# Patient Record
Sex: FEMALE | Race: WHITE | Employment: OTHER | URBAN - METROPOLITAN AREA
[De-identification: names, ages, dates, MRNs, and addresses within clinical notes are randomized per-mention and may not be internally consistent; named-entity substitution may affect disease eponyms.]

---

## 2017-06-05 PROBLEM — Z51.81 MEDICATION MONITORING ENCOUNTER: Status: ACTIVE | Noted: 2017-06-05

## 2017-08-01 ENCOUNTER — OFFICE VISIT (OUTPATIENT)
Age: 72
End: 2017-08-01

## 2017-08-01 ENCOUNTER — HOSPITAL ENCOUNTER (OUTPATIENT)
Dept: GENERAL RADIOLOGY | Age: 72
Discharge: HOME | End: 2017-08-01
Attending: INTERNAL MEDICINE | Admitting: INTERNAL MEDICINE

## 2017-08-01 VITALS
HEIGHT: 63 IN | DIASTOLIC BLOOD PRESSURE: 76 MMHG | SYSTOLIC BLOOD PRESSURE: 138 MMHG | WEIGHT: 150.8 LBS | BODY MASS INDEX: 26.72 KG/M2

## 2017-08-01 DIAGNOSIS — M81.0 OSTEOPOROSIS, POSTMENOPAUSAL: Primary | ICD-10-CM

## 2017-08-01 DIAGNOSIS — M81.0 OSTEOPOROSIS, POSTMENOPAUSAL: ICD-10-CM

## 2017-08-01 DIAGNOSIS — Z51.81 MEDICATION MONITORING ENCOUNTER: ICD-10-CM

## 2017-08-01 PROCEDURE — 77080 DXA BONE DENSITY AXIAL: CPT | Performed by: INTERNAL MEDICINE

## 2017-08-01 PROCEDURE — 99214 OFFICE O/P EST MOD 30 MIN: CPT | Performed by: INTERNAL MEDICINE

## 2017-08-03 ENCOUNTER — PROCEDURE VISIT (OUTPATIENT)
Age: 72
End: 2017-08-03

## 2017-08-03 DIAGNOSIS — M81.0 OSTEOPOROSIS, POSTMENOPAUSAL: Primary | ICD-10-CM

## 2017-11-14 DIAGNOSIS — M81.0 OSTEOPOROSIS, POSTMENOPAUSAL: Primary | ICD-10-CM

## 2017-11-16 ENCOUNTER — TELEPHONE (OUTPATIENT)
Age: 72
End: 2017-11-16

## 2017-11-16 NOTE — TELEPHONE ENCOUNTER
Spoke with Brody Salazar at [facility] related to patient has new insurance. Vivien Mclean [phone], eff. 11/1/17, [phone].  [place] to verify insurance ASAP

## 2017-11-28 DIAGNOSIS — M81.0 OSTEOPOROSIS, POSTMENOPAUSAL: Primary | ICD-10-CM

## 2017-11-30 ENCOUNTER — NURSE ONLY (OUTPATIENT)
Age: 72
End: 2017-11-30

## 2017-11-30 DIAGNOSIS — M81.0 OSTEOPOROSIS, POSTMENOPAUSAL: Primary | ICD-10-CM

## 2017-11-30 PROCEDURE — 96372 THER/PROPH/DIAG INJ SC/IM: CPT | Performed by: INTERNAL MEDICINE

## 2017-11-30 NOTE — PROGRESS NOTES
Informed patient if any signs of redness,rash,swelling or unusual symptoms occur, please contact the office. Prolia given per physician order. Prolia supplied by the physician office. Patient to remain in the waiting room for approximately 20 minutes due to this is the first Prolia injection.

## 2018-05-08 DIAGNOSIS — M81.0 OSTEOPOROSIS, POSTMENOPAUSAL: Primary | ICD-10-CM

## 2018-05-31 ENCOUNTER — NURSE ONLY (OUTPATIENT)
Age: 73
End: 2018-05-31

## 2018-05-31 DIAGNOSIS — M81.0 OSTEOPOROSIS, POSTMENOPAUSAL: Primary | ICD-10-CM

## 2018-05-31 PROCEDURE — 96372 THER/PROPH/DIAG INJ SC/IM: CPT | Performed by: INTERNAL MEDICINE

## 2018-05-31 PROCEDURE — 99999 PR OFFICE/OUTPT VISIT,PROCEDURE ONLY: CPT | Performed by: INTERNAL MEDICINE

## 2018-12-14 DIAGNOSIS — M81.0 OSTEOPOROSIS, POSTMENOPAUSAL: Primary | ICD-10-CM

## 2018-12-17 ENCOUNTER — PROCEDURE VISIT (OUTPATIENT)
Dept: ENDOCRINOLOGY | Age: 73
End: 2018-12-17
Payer: MEDICARE

## 2018-12-17 ENCOUNTER — HOSPITAL ENCOUNTER (OUTPATIENT)
Dept: GENERAL RADIOLOGY | Age: 73
Discharge: HOME | End: 2018-12-17
Payer: MEDICARE

## 2018-12-17 ENCOUNTER — OFFICE VISIT (OUTPATIENT)
Dept: ENDOCRINOLOGY | Age: 73
End: 2018-12-17
Payer: MEDICARE

## 2018-12-17 VITALS
DIASTOLIC BLOOD PRESSURE: 76 MMHG | BODY MASS INDEX: 27.14 KG/M2 | SYSTOLIC BLOOD PRESSURE: 135 MMHG | WEIGHT: 153.2 LBS | HEIGHT: 63 IN

## 2018-12-17 DIAGNOSIS — Z51.81 MEDICATION MONITORING ENCOUNTER: ICD-10-CM

## 2018-12-17 DIAGNOSIS — M81.0 OSTEOPOROSIS, POSTMENOPAUSAL: Primary | ICD-10-CM

## 2018-12-17 DIAGNOSIS — M81.0 OSTEOPOROSIS, POSTMENOPAUSAL: ICD-10-CM

## 2018-12-17 PROCEDURE — 3017F COLORECTAL CA SCREEN DOC REV: CPT | Performed by: INTERNAL MEDICINE

## 2018-12-17 PROCEDURE — 99214 OFFICE O/P EST MOD 30 MIN: CPT | Performed by: INTERNAL MEDICINE

## 2018-12-17 PROCEDURE — 1090F PRES/ABSN URINE INCON ASSESS: CPT | Performed by: INTERNAL MEDICINE

## 2018-12-17 PROCEDURE — G8484 FLU IMMUNIZE NO ADMIN: HCPCS | Performed by: INTERNAL MEDICINE

## 2018-12-17 PROCEDURE — G8399 PT W/DXA RESULTS DOCUMENT: HCPCS | Performed by: INTERNAL MEDICINE

## 2018-12-17 PROCEDURE — 1101F PT FALLS ASSESS-DOCD LE1/YR: CPT | Performed by: INTERNAL MEDICINE

## 2018-12-17 PROCEDURE — 4040F PNEUMOC VAC/ADMIN/RCVD: CPT | Performed by: INTERNAL MEDICINE

## 2018-12-17 PROCEDURE — 96372 THER/PROPH/DIAG INJ SC/IM: CPT | Performed by: INTERNAL MEDICINE

## 2018-12-17 PROCEDURE — 77080 DXA BONE DENSITY AXIAL: CPT | Performed by: INTERNAL MEDICINE

## 2018-12-17 PROCEDURE — 1036F TOBACCO NON-USER: CPT | Performed by: INTERNAL MEDICINE

## 2018-12-17 PROCEDURE — G8419 CALC BMI OUT NRM PARAM NOF/U: HCPCS | Performed by: INTERNAL MEDICINE

## 2018-12-17 PROCEDURE — G8427 DOCREV CUR MEDS BY ELIG CLIN: HCPCS | Performed by: INTERNAL MEDICINE

## 2018-12-17 PROCEDURE — 1123F ACP DISCUSS/DSCN MKR DOCD: CPT | Performed by: INTERNAL MEDICINE

## 2018-12-17 PROCEDURE — 77080 DXA BONE DENSITY AXIAL: CPT

## 2018-12-17 NOTE — PROGRESS NOTES
[facility] ([facility]) Osteoporosis and [address]  [address]  [address], [address]  Phone [phone]  Fax [phone]    NAME:  Candelaria Sheppard  :  1945  CONSULT DATE:    2015  MOST RECENT VISIT:  2017  TODAYS DATE:  2018    Labs @ [facility] 2018    PROBLEMS. Osteoporosis by DXA 2006, lowest T-score -2.8 in the spine (L2-L4)    Family history of osteoporosis, mother    Humerus fracture age 49, slipped on wet grass  Surgical menopause age 33, estrogen since , currently Premarin 0.625 mg/d  Hypertension    CURRENT MANAGEMENT FOR OSTEOPOROSIS. Calcium, 300 mg from low calcium foods,  150 mg milk, 300 mg yogurt, 300 mg cheese, 100 mg cottage cheese   diet MVI Ca+D other total    Calcium 1150    1150 mg/d   Vitamin D     2000 IU/d     25-OH D 481 ng/mL 2017 (desirable is 30-60 ng/mL)  Exercise, 30 min 2 x weekly, cardio and free weights  Pharmacologic therapy: Prolia 60 mg SQ twice yearly started 2017    PREVIOUS MEDICATIONS FOR OSTEOPOROSIS. Alendronate 3981-3855, stopped because T-scores were not stable  Actonel [id number], changed to Boniva per insurance  Boniva 150 mg monthly 2014-2015, stopped for a drug holiday    OTHER CURRENT MEDICATIONS (SELECTED): verapamil  OTC MEDICATIONS (SELECTED): Zantac, aspirin, fish oil, biotin, vitamins C and E    CHIEF COMPLAINT. Here for f/u of osteoporosis, monitoring treatment. No new related signs or symptoms. INTERVAL HISTORY. See problem list for chronic/inactive conditions. She received Prolia without side effects. No falls, near-falls or fractures. She feels well overall. FOR FULL DETAILS OF FAMILY HISTORY, PAST MEDICAL AND SURGICAL HISTORY, SOCIAL HISTORY, AND REVIEW OF SYSTEMS, SEE PATIENT QUESTIONNAIRE OF TODAYS DATE. PHYSICAL EXAMINATION. GENERAL. Well-nourished, well-developed, normally proportioned adult. MENTAL STATUS. Pleasant mood.   Oriented to time, place, and

## 2019-06-19 ENCOUNTER — NURSE ONLY (OUTPATIENT)
Dept: ENDOCRINOLOGY | Age: 74
End: 2019-06-19
Payer: MEDICARE

## 2019-06-19 DIAGNOSIS — M81.0 OSTEOPOROSIS, POSTMENOPAUSAL: ICD-10-CM

## 2019-06-19 PROCEDURE — 96372 THER/PROPH/DIAG INJ SC/IM: CPT | Performed by: INTERNAL MEDICINE

## 2020-01-08 ENCOUNTER — TELEPHONE (OUTPATIENT)
Dept: ENDOCRINOLOGY | Age: 75
End: 2020-01-08

## 2020-01-09 ENCOUNTER — NURSE ONLY (OUTPATIENT)
Dept: ENDOCRINOLOGY | Age: 75
End: 2020-01-09
Payer: MEDICARE

## 2020-01-09 PROCEDURE — 96372 THER/PROPH/DIAG INJ SC/IM: CPT | Performed by: INTERNAL MEDICINE

## 2020-01-09 NOTE — PROGRESS NOTES
Informed patient if any signs of redness,rash,swelling or unusual symptoms occur, please contact the office. Prolia given per physician order. Prolia supplied by the physician office. It is the patient's responsibility to notify the physician office of new insurance plan prior to appointment to prevent delay in treatment. Please send a copy of the front and back of the insurance card either by fax, mail or stop by the office.   Per patient, she spoke with [email or web address] insurance and they do not need a prior authorization due to patient has medicare for primary

## 2020-07-10 ENCOUNTER — NURSE ONLY (OUTPATIENT)
Dept: ENDOCRINOLOGY | Age: 75
End: 2020-07-10
Payer: MEDICARE

## 2020-07-10 PROCEDURE — 96372 THER/PROPH/DIAG INJ SC/IM: CPT | Performed by: INTERNAL MEDICINE

## 2020-07-10 NOTE — PROGRESS NOTES
Prolia supplied by the physician office. Informed patient if any signs of redness,rash,swelling or unusual symptoms occur, please contact the office. Prolia given per physician order. It is the patient's responsibility to notify the physician office of new insurance plan prior to appointment to prevent delay in treatment. Please send a copy of the front and back of the insurance card either by fax, mail or stop by the office.

## 2021-01-12 ENCOUNTER — OFFICE VISIT (OUTPATIENT)
Dept: ENDOCRINOLOGY | Age: 76
End: 2021-01-12
Payer: MEDICARE

## 2021-01-12 ENCOUNTER — HOSPITAL ENCOUNTER (OUTPATIENT)
Dept: GENERAL RADIOLOGY | Age: 76
Discharge: HOME | End: 2021-01-12
Payer: MEDICARE

## 2021-01-12 ENCOUNTER — PROCEDURE VISIT (OUTPATIENT)
Dept: ENDOCRINOLOGY | Age: 76
End: 2021-01-12

## 2021-01-12 VITALS
SYSTOLIC BLOOD PRESSURE: 128 MMHG | DIASTOLIC BLOOD PRESSURE: 74 MMHG | HEIGHT: 63 IN | WEIGHT: 152.8 LBS | BODY MASS INDEX: 27.07 KG/M2

## 2021-01-12 DIAGNOSIS — Z51.81 MEDICATION MONITORING ENCOUNTER: ICD-10-CM

## 2021-01-12 DIAGNOSIS — M81.0 OSTEOPOROSIS, POSTMENOPAUSAL: ICD-10-CM

## 2021-01-12 DIAGNOSIS — M81.0 OSTEOPOROSIS, POSTMENOPAUSAL: Primary | ICD-10-CM

## 2021-01-12 PROCEDURE — 77080 DXA BONE DENSITY AXIAL: CPT

## 2021-01-12 PROCEDURE — 99214 OFFICE O/P EST MOD 30 MIN: CPT | Performed by: INTERNAL MEDICINE

## 2021-01-12 PROCEDURE — G8417 CALC BMI ABV UP PARAM F/U: HCPCS | Performed by: INTERNAL MEDICINE

## 2021-01-12 PROCEDURE — G8484 FLU IMMUNIZE NO ADMIN: HCPCS | Performed by: INTERNAL MEDICINE

## 2021-01-12 PROCEDURE — 77080 DXA BONE DENSITY AXIAL: CPT | Performed by: INTERNAL MEDICINE

## 2021-01-12 PROCEDURE — 1090F PRES/ABSN URINE INCON ASSESS: CPT | Performed by: INTERNAL MEDICINE

## 2021-01-12 PROCEDURE — G8427 DOCREV CUR MEDS BY ELIG CLIN: HCPCS | Performed by: INTERNAL MEDICINE

## 2021-01-12 PROCEDURE — 96372 THER/PROPH/DIAG INJ SC/IM: CPT | Performed by: INTERNAL MEDICINE

## 2021-01-12 PROCEDURE — 3017F COLORECTAL CA SCREEN DOC REV: CPT | Performed by: INTERNAL MEDICINE

## 2021-01-12 PROCEDURE — G8399 PT W/DXA RESULTS DOCUMENT: HCPCS | Performed by: INTERNAL MEDICINE

## 2021-01-12 PROCEDURE — 1123F ACP DISCUSS/DSCN MKR DOCD: CPT | Performed by: INTERNAL MEDICINE

## 2021-01-12 PROCEDURE — 4040F PNEUMOC VAC/ADMIN/RCVD: CPT | Performed by: INTERNAL MEDICINE

## 2021-01-12 PROCEDURE — 1036F TOBACCO NON-USER: CPT | Performed by: INTERNAL MEDICINE

## 2021-01-12 RX ORDER — METFORMIN HYDROCHLORIDE 500 MG/1
1000 TABLET, EXTENDED RELEASE ORAL 2 TIMES DAILY
COMMUNITY
Start: 2020-07-13

## 2021-01-12 RX ORDER — SODIUM PHOSPHATE,MONO-DIBASIC 19G-7G/118
1 ENEMA (ML) RECTAL DAILY
COMMUNITY

## 2021-01-12 RX ORDER — ATORVASTATIN CALCIUM 40 MG/1
40 TABLET, FILM COATED ORAL DAILY
COMMUNITY
Start: 2020-09-15 | End: 2022-02-08

## 2021-01-12 NOTE — RESULT ENCOUNTER NOTE
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 Surgical menopause was age 33. She continues to take estrogen (started ). She fractured her humerus at age 49 (slipped on wet grass). She took Alendronate 4164-7139, Actonel [id number] and Boniva -2015 (stopped for a holiday). Current treatment is Prolia started 2017. Family history of osteoporosis (mother). EQUIPMENT: Hologic Discovery. POSITIONING: Good. REGIONS OF INTEREST: Correct. ARTIFACTS: None. STUDY VALID? Yes. Spine BMD is spuriously high because of generalized degenerative change;  L1 and L4 were deleted starting  due to T-score discrepancy. T-scores  Initial study: 07/15/2015 L2-L3 -2.6 left fem. neck -2.2   Current study 2021 L2-L3 -3.1 left fem. neck -2.4     The table below shows bone mineral density (grams/cm2), the appropriate measure for comparing serial scans. An increase or decrease is significant based on precision studies done at our center according to the ISCD protocol. PA spine Proximal Femur (left)   Date L2-L3 Fem. neck Trochanter Total hip   07/15/2015 0.733 0.605 0.537 0.792   2017 0.694 0.569 0.523 0.776   2018 0.736 0.581 0.530 0.777   2021 0.714 0.582 0.554 0.790     IMPRESSION:  BONE DENSITY IS LOW, CONSISTENT WITH OSTEOPOROSIS. SINCE THE LAST DXA, BMD DID NOT CHANGE SIGNIFICANTLY IN THE SPINE OR LEFT HIP BUT IS TRENDING UP IN THE TROCHANTER. COMPARED WITH 2017, BEFORE STARTING PROLIA, BMD IS HIGHER NOW IN THE TROCHANTER AND TRENDING UP IN THE SPINE. Consider repeating this study in 1-2 years to assess the patient's progress. _________________________________________________   Liz Swartz MD, Director, [facility]) Osteoporosis and [address] [place] [address]

## 2021-01-12 NOTE — PROGRESS NOTES
[facility] ([facility]) Osteoporosis and [address]  [place] [address], [address]  Phone [phone]  Fax [phone]    NAME:  Teddy Gilford  :  1945  CONSULT DATE:    2015  MOST RECENT VISIT:  2018  TODAYS DATE:  2021    Labs @ [facility] 10/2020    PROBLEMS. Osteoporosis by DXA 2006, lowest T-score -2.8 in the spine (L2-L4)    Family history of osteoporosis, mother    Humerus fracture age 49, slipped on wet grass  Surgical menopause age 33, estrogen since , currently Premarin 0.625 mg/d  Hypertension    CURRENT MANAGEMENT FOR OSTEOPOROSIS. Calcium, 300 mg from low calcium foods,  150 mg milk, 300 mg yogurt, 300 mg cheese, 100 mg cottage cheese   diet MVI Ca+D other total    Calcium 1150    1150 mg/d   Vitamin D     2000 IU/d     25-OH D 41 ng/mL 2017 (desirable is 30-60 ng/mL)  Exercise, 30 min 2 x weekly, cardio and free weights  Pharmacologic therapy: Prolia 60 mg SQ twice yearly started 2017    PREVIOUS MEDICATIONS FOR OSTEOPOROSIS. Alendronate 7483-6077, stopped because T-scores were not stable  Actonel [id number], changed to Boniva per insurance  Boniva 150 mg monthly 2014-2015, stopped for a drug holiday    OTHER CURRENT MEDICATIONS (SELECTED): verapamil  OTC MEDICATIONS (SELECTED): Zantac, aspirin, fish oil, biotin, vitamins C and E    CHIEF COMPLAINT. Here for f/u of osteoporosis, monitoring treatment. No new related signs or symptoms. INTERVAL HISTORY. See problem list for chronic/inactive conditions. She received Prolia without side effects. No falls, near-falls or fractures. She feels well overall. FOR FULL DETAILS OF FAMILY HISTORY, PAST MEDICAL AND SURGICAL HISTORY, SOCIAL HISTORY, AND REVIEW OF SYSTEMS, SEE PATIENT QUESTIONNAIRE OF TODAYS DATE. PHYSICAL EXAMINATION. GENERAL. Well-nourished, well-developed, normally proportioned adult. MENTAL STATUS. Pleasant mood. Oriented to time, place, and person. ORAL. Teeth appear to be in good condition. MUSCULOSKELETAL. Spinal contours are normal.  No spine tenderness to palpation or percussion. Three finger spaces between ribs and pelvis. Gait steady without assistance. NEUROLOGICAL. Able to rise from chair without using arms. No apparent motor or sensory deficit. Coordination appears normal       BONE DENSITY. Most recent done here using Hologic equipment. Prior studies were done at [facility]. Vertebral levels were mislabeled (L1 was really L2). T-scores  Initial study: 07/15/2015 L2-L3 -2.6 left fem. neck -2.2   Current study 01/12/2021 L2-L3 -3.1 left fem. neck -2.4     The table below shows bone mineral density (grams/cm2), the appropriate measure for comparing serial scans. An increase or decrease is significant based on precision studies done at our center according to the ISCD protocol. PA spine Proximal Femur (left)   Date L2-L3 Fem. neck Trochanter Total hip   07/15/2015 0.733 0.605 0.537 0.792   08/01/2017 0.694 0.569 0.523 0.776   12/17/2018 0.736 0.581 0.530 0.777   01/12/2021 0.714 0.582 0.554 0.790     IMPRESSION:  BONE DENSITY IS LOW, CONSISTENT WITH OSTEOPOROSIS. SINCE THE LAST DXA, BMD DID NOT CHANGE SIGNIFICANTLY IN THE SPINE OR LEFT HIP BUT IS TRENDING UP IN THE TROCHANTER. COMPARED WITH 2017, BEFORE STARTING PROLIA, BMD IS HIGHER NOW IN THE TROCHANTER AND TRENDING UP IN THE SPINE. Labs: 11/2014, CBC. 07/2015 Ca 9.1, Cr 0.7. 01/2017 Ca 9.4 Cr 0.8. 06/2018 Ca 9.4 Cr 0.8.  10/2020 Ca 9.4 Cr 0.8. X-rays viewed: DXA printouts reviewed. Radiology reports: 12/2002 LS MR mild anterolisthesis, no fractures    ASSESSMENT. Osteoporosis, bone density lower than desirable but stable/increased since 2006 with treatment with estrogen and bisphosphonates. Without effective treatment, risk of fracture is high. She is doing well with Prolia started 08/2017. PLANS. OK to give Prolia today and continue Prolia 60 mg SQ twice yearly. Time spent today: 30-40 minutes. Narciso Swartz MD, Director, [facility]) Osteoporosis and Bone Health Services    CC: Salomon Nicole MD

## 2021-07-14 ENCOUNTER — NURSE ONLY (OUTPATIENT)
Dept: ENDOCRINOLOGY | Age: 76
End: 2021-07-14
Payer: MEDICARE

## 2021-07-14 DIAGNOSIS — M81.0 OSTEOPOROSIS, POSTMENOPAUSAL: ICD-10-CM

## 2021-07-14 PROCEDURE — 96372 THER/PROPH/DIAG INJ SC/IM: CPT | Performed by: INTERNAL MEDICINE

## 2022-02-08 ENCOUNTER — PROCEDURE VISIT (OUTPATIENT)
Dept: ENDOCRINOLOGY | Age: 77
End: 2022-02-08

## 2022-02-08 ENCOUNTER — HOSPITAL ENCOUNTER (OUTPATIENT)
Dept: GENERAL RADIOLOGY | Age: 77
Discharge: HOME | End: 2022-02-08
Payer: MEDICARE

## 2022-02-08 ENCOUNTER — OFFICE VISIT (OUTPATIENT)
Dept: ENDOCRINOLOGY | Age: 77
End: 2022-02-08
Payer: MEDICARE

## 2022-02-08 VITALS
BODY MASS INDEX: 25.91 KG/M2 | WEIGHT: 146.2 LBS | DIASTOLIC BLOOD PRESSURE: 78 MMHG | SYSTOLIC BLOOD PRESSURE: 129 MMHG | HEIGHT: 63 IN

## 2022-02-08 DIAGNOSIS — M81.0 OSTEOPOROSIS, POSTMENOPAUSAL: ICD-10-CM

## 2022-02-08 DIAGNOSIS — Z51.81 MEDICATION MONITORING ENCOUNTER: ICD-10-CM

## 2022-02-08 DIAGNOSIS — M81.0 OSTEOPOROSIS, POSTMENOPAUSAL: Primary | ICD-10-CM

## 2022-02-08 PROCEDURE — 1036F TOBACCO NON-USER: CPT | Performed by: INTERNAL MEDICINE

## 2022-02-08 PROCEDURE — 77080 DXA BONE DENSITY AXIAL: CPT

## 2022-02-08 PROCEDURE — G8399 PT W/DXA RESULTS DOCUMENT: HCPCS | Performed by: INTERNAL MEDICINE

## 2022-02-08 PROCEDURE — 1090F PRES/ABSN URINE INCON ASSESS: CPT | Performed by: INTERNAL MEDICINE

## 2022-02-08 PROCEDURE — G8484 FLU IMMUNIZE NO ADMIN: HCPCS | Performed by: INTERNAL MEDICINE

## 2022-02-08 PROCEDURE — 4040F PNEUMOC VAC/ADMIN/RCVD: CPT | Performed by: INTERNAL MEDICINE

## 2022-02-08 PROCEDURE — 3017F COLORECTAL CA SCREEN DOC REV: CPT | Performed by: INTERNAL MEDICINE

## 2022-02-08 PROCEDURE — G8417 CALC BMI ABV UP PARAM F/U: HCPCS | Performed by: INTERNAL MEDICINE

## 2022-02-08 PROCEDURE — 99214 OFFICE O/P EST MOD 30 MIN: CPT | Performed by: INTERNAL MEDICINE

## 2022-02-08 PROCEDURE — 1123F ACP DISCUSS/DSCN MKR DOCD: CPT | Performed by: INTERNAL MEDICINE

## 2022-02-08 PROCEDURE — 77080 DXA BONE DENSITY AXIAL: CPT | Performed by: INTERNAL MEDICINE

## 2022-02-08 PROCEDURE — 96372 THER/PROPH/DIAG INJ SC/IM: CPT | Performed by: INTERNAL MEDICINE

## 2022-02-08 PROCEDURE — G8427 DOCREV CUR MEDS BY ELIG CLIN: HCPCS | Performed by: INTERNAL MEDICINE

## 2022-02-08 NOTE — PROGRESS NOTES
appear to be in good condition. MUSCULOSKELETAL. Spinal contours are normal.  No spine tenderness to palpation or percussion. Three finger spaces between ribs and pelvis. Gait steady without assistance. NEUROLOGICAL. Able to rise from chair without using arms. No apparent motor or sensory deficit. Coordination appears normal       BONE DENSITY. Most recent done here using Hologic equipment    T-scores  Initial study: 07/15/2015 L2-L3 -2.6 left fem. neck -2.2   Current study 02/08/2022 L2-L3 -3.0 left fem. neck -2.1     The table below shows bone mineral density (grams/cm2), the appropriate measure for comparing serial scans. An increase or decrease is significant based on precision studies done at our center according to the ISCD protocol. PA spine Proximal Femur (left)   Date L2-L3 Fem. neck Trochanter Total hip   07/15/2015 0.733 0.605 0.537 0.792   08/01/2017 0.694 0.569 0.523 0.776   12/17/2018 0.736 0.581 0.530 0.777   01/12/2021 0.714 0.582 0.554 0.790   02/08/2022 0.729 0.614 0.542 0.793     IMPRESSION:  BONE DENSITY IS LOW, CONSISTENT WITH OSTEOPOROSIS. SINCE THE LAST DXA, BMD INCREASED IN THE FEMORAL NECK. COMPARED WITH 2017, BEFORE STARTING PROLIA, BMD IS HIGHER NOW IN THE SPINE AND FEMORAL NECK AND TRENDING UP IN THE TROCHANTER AND TOTAL HIP. Labs: 11/2014, CBC. 07/2015 Ca 9.1, Cr 0.7. 01/2017 Ca 9.4 Cr 0.8. 06/2018 Ca 9.4 Cr 0.8.  10/2020 Ca 9.4 Cr 0.8. 08/2021 Ca 8.9 Cr 0.8. 01/2022 Ca 9.1 Cr 0.8. X-rays viewed: DXA printouts reviewed. Radiology reports: 12/2002 LS MR mild anterolisthesis, no fractures    ASSESSMENT. Osteoporosis, bone density lower than desirable but stable/increased since 2006 with treatment with estrogen and bisphosphonates. Without effective treatment, risk of fracture is high. She is doing well with Prolia started 08/2017. PLANS. OK to give Prolia today and continue Prolia 60 mg SQ twice yearly. Return appointment with DXA in 1 year.  Time spent today: 30-39 minutes. Benny Swartz MD, Director, [facility]) Osteoporosis and Bone Health Services    CC: Geraldine Griffith MD

## 2022-02-08 NOTE — RESULT ENCOUNTER NOTE
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 Surgical menopause was age 33. She continues to take estrogen (started ). She fractured her humerus at age 49 (slipped on wet grass). She took Alendronate 8477-5994, Actonel [id number] and Boniva -2015 (stopped for a holiday). Current treatment is Prolia started 2017. Family history of osteoporosis (mother). EQUIPMENT: Hologic Discovery. POSITIONING: Good. REGIONS OF INTEREST: Correct. ARTIFACTS: None. STUDY VALID? Yes. Spine BMD is spuriously high because of generalized degenerative change;  L1 and L4 were deleted starting  due to T-score discrepancy. T-scores  Initial study: 07/15/2015 L2-L3 -2.6 left fem. neck -2.2   Current study 2022 L2-L3 -3.0 left fem. neck -2.1     The table below shows bone mineral density (grams/cm2), the appropriate measure for comparing serial scans. An increase or decrease is significant based on precision studies done at our center according to the ISCD protocol. PA spine Proximal Femur (left)   Date L2-L3 Fem. neck Trochanter Total hip   07/15/2015 0.733 0.605 0.537 0.792   2017 0.694 0.569 0.523 0.776   2018 0.736 0.581 0.530 0.777   2021 0.714 0.582 0.554 0.790   2022 0.729 0.614 0.542 0.793     IMPRESSION:  BONE DENSITY IS LOW, CONSISTENT WITH OSTEOPOROSIS. SINCE THE LAST DXA, BMD INCREASED IN THE FEMORAL NECK. COMPARED WITH 2017, BEFORE STARTING PROLIA, BMD IS HIGHER NOW IN THE SPINE AND FEMORAL NECK AND TRENDING UP IN THE TROCHANTER AND TOTAL HIP.       Consider repeating this study in 1-2 years to assess the patient's progress. _________________________________________________   Ori Swartz MD, Director, [facility] ([facility]) Osteoporosis and [address] [facility]

## 2022-08-11 ENCOUNTER — NURSE ONLY (OUTPATIENT)
Dept: ENDOCRINOLOGY | Age: 77
End: 2022-08-11
Payer: MEDICARE

## 2022-08-11 DIAGNOSIS — M81.0 OSTEOPOROSIS, POSTMENOPAUSAL: Primary | ICD-10-CM

## 2022-08-11 PROCEDURE — 96372 THER/PROPH/DIAG INJ SC/IM: CPT | Performed by: INTERNAL MEDICINE

## 2023-02-15 ENCOUNTER — NURSE ONLY (OUTPATIENT)
Dept: ENDOCRINOLOGY | Age: 78
End: 2023-02-15
Payer: MEDICARE

## 2023-02-15 DIAGNOSIS — M81.0 OSTEOPOROSIS, POSTMENOPAUSAL: Primary | ICD-10-CM

## 2023-02-15 PROCEDURE — 96372 THER/PROPH/DIAG INJ SC/IM: CPT | Performed by: INTERNAL MEDICINE

## 2023-03-06 ENCOUNTER — HOSPITAL ENCOUNTER (OUTPATIENT)
Dept: GENERAL RADIOLOGY | Age: 78
Discharge: HOME | End: 2023-03-06
Payer: MEDICARE

## 2023-03-06 ENCOUNTER — OFFICE VISIT (OUTPATIENT)
Dept: ENDOCRINOLOGY | Age: 78
End: 2023-03-06
Payer: MEDICARE

## 2023-03-06 ENCOUNTER — PROCEDURE VISIT (OUTPATIENT)
Dept: ENDOCRINOLOGY | Age: 78
End: 2023-03-06

## 2023-03-06 VITALS
BODY MASS INDEX: 25.45 KG/M2 | HEIGHT: 63 IN | DIASTOLIC BLOOD PRESSURE: 70 MMHG | WEIGHT: 143.6 LBS | SYSTOLIC BLOOD PRESSURE: 139 MMHG

## 2023-03-06 DIAGNOSIS — Z51.81 MEDICATION MONITORING ENCOUNTER: ICD-10-CM

## 2023-03-06 DIAGNOSIS — M81.0 OSTEOPOROSIS, POSTMENOPAUSAL: Primary | ICD-10-CM

## 2023-03-06 DIAGNOSIS — M81.0 OSTEOPOROSIS, POSTMENOPAUSAL: ICD-10-CM

## 2023-03-06 PROCEDURE — 99214 OFFICE O/P EST MOD 30 MIN: CPT | Performed by: INTERNAL MEDICINE

## 2023-03-06 PROCEDURE — 77080 DXA BONE DENSITY AXIAL: CPT

## 2023-03-06 PROCEDURE — 1090F PRES/ABSN URINE INCON ASSESS: CPT | Performed by: INTERNAL MEDICINE

## 2023-03-06 PROCEDURE — G8399 PT W/DXA RESULTS DOCUMENT: HCPCS | Performed by: INTERNAL MEDICINE

## 2023-03-06 PROCEDURE — G8417 CALC BMI ABV UP PARAM F/U: HCPCS | Performed by: INTERNAL MEDICINE

## 2023-03-06 PROCEDURE — 1123F ACP DISCUSS/DSCN MKR DOCD: CPT | Performed by: INTERNAL MEDICINE

## 2023-03-06 PROCEDURE — G8427 DOCREV CUR MEDS BY ELIG CLIN: HCPCS | Performed by: INTERNAL MEDICINE

## 2023-03-06 PROCEDURE — 1036F TOBACCO NON-USER: CPT | Performed by: INTERNAL MEDICINE

## 2023-03-06 PROCEDURE — G8484 FLU IMMUNIZE NO ADMIN: HCPCS | Performed by: INTERNAL MEDICINE

## 2023-03-06 PROCEDURE — 77080 DXA BONE DENSITY AXIAL: CPT | Performed by: INTERNAL MEDICINE

## 2023-03-06 RX ORDER — DESONIDE 0.5 MG/G
CREAM TOPICAL
COMMUNITY
Start: 2023-01-18

## 2023-03-06 RX ORDER — BETAMETHASONE DIPROPIONATE 0.5 MG/G
CREAM TOPICAL
COMMUNITY
Start: 2023-01-09

## 2023-03-06 NOTE — PROGRESS NOTES
[facility] ([facility]) Osteoporosis and [address] [facility]  [place] [address], [address]  Phone [phone]  Fax [phone]    NAME:  Esha Mendez  :  1945  CONSULT DATE:    2015  MOST RECENT VISIT:  2022  TODAY'S DATE:  2023    Labs @ [facility] 2022    PROBLEMS. Osteoporosis by DXA 2006, lowest T-score -2.8 in the spine (L2-L4)    Family history of osteoporosis, mother    Humerus fracture age 49, slipped on wet grass  Surgical menopause age 33, estrogen [id number]  Hypertension  DM2    CURRENT MANAGEMENT FOR OSTEOPOROSIS. Calcium, 300 mg from low calcium foods,  150 mg milk, 300 mg yogurt, 300 mg cheese, 100 mg cottage cheese   diet MVI Ca+D other total    Calcium 1150    1150 mg/d   Vitamin D     2000 IU/d     25-OH D 46 ng/mL 2022 (desirable is 30-60 ng/mL)  Exercise, 30 min 2 x weekly, cardio and free weights  Pharmacologic therapy: Prolia 60 mg SQ twice yearly started 2017    PREVIOUS MEDICATIONS FOR OSTEOPOROSIS. Alendronate 6634-2853, stopped because T-scores were not stable  Actonel [id number], changed to Boniva per insurance  Boniva 150 mg monthly 2014-2015, stopped for a holiday    OTHER CURRENT MEDICATIONS (SELECTED): verapamil, metformin  OTC MEDICATIONS (SELECTED): Zantac, aspirin, fish oil, biotin, vitamins C and E    CHIEF COMPLAINT. Here for f/u of osteoporosis, monitoring treatment. No new related signs or symptoms. INTERVAL HISTORY. See problem list for chronic/inactive conditions. She received Prolia without side effects. No falls, near-falls or fractures. She feels well overall. FOR FULL DETAILS OF FAMILY HISTORY, PAST MEDICAL AND SURGICAL HISTORY, SOCIAL HISTORY, AND REVIEW OF SYSTEMS, SEE PATIENT QUESTIONNAIRE OF TODAY'S DATE. PHYSICAL EXAMINATION. GENERAL. Well-nourished, well-developed, normally proportioned adult. MENTAL STATUS. Pleasant mood. Oriented to time, place, and person. ORAL.  Teeth appear to be in good condition. MUSCULOSKELETAL. Spinal contours are normal.  No spine tenderness to palpation or percussion. Three finger spaces between ribs and pelvis. Gait steady without assistance. NEUROLOGICAL. Able to rise from chair without using arms. No apparent motor or sensory deficit. Coordination appears normal         BONE DENSITY. Most recent done here using Hologic equipment    T-scores  Initial study: 07/15/2015 L2-L3 -2.6 left fem. neck -2.2   Current study 03/06/2023 L2-L3 -3.0 left fem. neck -2.1     The table below shows bone mineral density (grams/cm2), the appropriate measure for comparing serial scans. An increase or decrease is significant based on precision studies done at our center according to the ISCD protocol. PA spine Proximal Femur (left)   Date L2-L3 Fem. neck Trochanter Total hip   07/15/2015 0.733 0.605 0.537 0.792   08/01/2017 0.694 0.569 0.523 0.776   12/17/2018 0.736 0.581 0.530 0.777   01/12/2021 0.714 0.582 0.554 0.790   02/08/2022 0.729 0.614 0.542 0.793   03/06/2023 0.767 0.622 0.548 0.801     IMPRESSION:  BONE DENSITY IS LOW, CONSISTENT WITH OSTEOPOROSIS. SINCE THE LAST DXA, BMD INCREASED IN THE SPINE AND DID NOT CHANGE SIGNIFICANTLY IN THE LEFT HIP. COMPARED WITH 2017, BEFORE STARTING PROLIA, BMD IS HIGHER NOW IN THE SPINE AND FEMORAL NECK AND TRENDING UP IN THE TROCHANTER AND TOTAL HIP. Labs: 11/2014, CBC. 07/2015 Ca 9.1, Cr 0.7. 01/2017 Ca 9.4 Cr 0.8. 06/2018 Ca 9.4 Cr 0.8.  10/2020 Ca 9.4 Cr 0.8. 08/2021 Ca 8.9 Cr 0.8. 01/2022 Ca 9.1 Cr 0.8. X-rays viewed: DXA printouts reviewed. Radiology reports: 12/2002 LS MR mild anterolisthesis, no fractures    ASSESSMENT. Osteoporosis, bone density lower than desirable but stable/increased since 2006 with treatment with estrogen and bisphosphonates. Without effective treatment, risk of fracture is high. She is doing well with Prolia started 08/2017. PLANS.  Continue Prolia 60 mg SQ twice yearly (next dose due on/after 08/11/2023). Return appointment with DXA in 1 year. Time spent today: 30-39 minutes. Mandeep Swartz MD, Director, [facility]) Osteoporosis and Bone Health Services    CC: Alonso Logan MD

## 2023-03-06 NOTE — RESULT ENCOUNTER NOTE
[facility]) Osteoporosis and [address] [facility] [address] [facility], [address] [place] [address],[place] [address]  Phone [phone]  Fax [phone]    NAME: Denice Pederson   : 1945   STUDY DATE: 2023     REFERRING PROVIDER: Nayeli David MD    INDICATION(S) FOR PERFORMING THE STUDY:  osteoporosis, postmenopausal (M89.0)>    CLINICAL INFORMATION PROVIDED BY THE PATIENT: 80-year-old woman. Surgical menopause was age 33. She continues to take estrogen (started ). She fractured her humerus at age 49 (slipped on wet grass). She took Alendronate 4117-1454, Actonel [id number] and Boniva -2015 (stopped for a holiday). Current treatment is Prolia started 2017. Family history of osteoporosis (mother). EQUIPMENT: Hologic Discovery. POSITIONING: Good. REGIONS OF INTEREST: Correct. ARTIFACTS: None. STUDY VALID? Yes. Spine BMD is spuriously high because of generalized degenerative change;  L1 and L4 were deleted starting  due to T-score discrepancy. T-scores  Initial study: 07/15/2015 L2-L3 -2.6 left fem. neck -2.2   Current study 2023 L2-L3 -3.0 left fem. neck -2.1     The table below shows bone mineral density (grams/cm2), the appropriate measure for comparing serial scans. An increase or decrease is significant based on precision studies done at our center according to the ISCD protocol. PA spine Proximal Femur (left)   Date L2-L3 Fem. neck Trochanter Total hip   07/15/2015 0.733 0.605 0.537 0.792   2017 0.694 0.569 0.523 0.776   2018 0.736 0.581 0.530 0.777   2021 0.714 0.582 0.554 0.790   2022 0.729 0.614 0.542 0.793   2023 0.767 0.622 0.548 0.801     IMPRESSION:  BONE DENSITY IS LOW, CONSISTENT WITH OSTEOPOROSIS.   SINCE THE LAST DXA, BMD INCREASED IN THE SPINE AND DID NOT CHANGE SIGNIFICANTLY IN THE LEFT HIP. COMPARED WITH 2017, BEFORE STARTING PROLIA, BMD IS HIGHER NOW IN THE SPINE AND FEMORAL NECK AND TRENDING UP IN THE TROCHANTER AND TOTAL HIP. Consider repeating this study in 1-2 years to assess the patient's progress. _________________________________________________   Florinda Cushing Watts MD, Director, [facility]) Osteoporosis and [address] [place] [address]

## 2023-08-16 ENCOUNTER — NURSE ONLY (OUTPATIENT)
Dept: ENDOCRINOLOGY | Age: 78
End: 2023-08-16
Payer: MEDICARE

## 2023-08-16 DIAGNOSIS — M81.0 OSTEOPOROSIS, POSTMENOPAUSAL: Primary | ICD-10-CM

## 2023-08-16 PROCEDURE — 96372 THER/PROPH/DIAG INJ SC/IM: CPT | Performed by: INTERNAL MEDICINE

## 2024-02-22 ENCOUNTER — NURSE ONLY (OUTPATIENT)
Dept: ENDOCRINOLOGY | Age: 79
End: 2024-02-22
Payer: MEDICARE

## 2024-02-22 DIAGNOSIS — M81.0 OSTEOPOROSIS, POSTMENOPAUSAL: Primary | ICD-10-CM

## 2024-02-22 PROCEDURE — 96372 THER/PROPH/DIAG INJ SC/IM: CPT | Performed by: INTERNAL MEDICINE

## 2024-02-22 NOTE — PROGRESS NOTES
Prolia supplied by the physician office.Informed patient if any signs of redness,rash,swelling or unusual symptoms occur, please contact the office. Prolia given per physician order.

## 2024-04-22 ENCOUNTER — HOSPITAL ENCOUNTER (OUTPATIENT)
Dept: GENERAL RADIOLOGY | Age: 79
Discharge: HOME | End: 2024-04-22
Payer: MEDICARE

## 2024-04-22 ENCOUNTER — OFFICE VISIT (OUTPATIENT)
Dept: ENDOCRINOLOGY | Age: 79
End: 2024-04-22
Payer: MEDICARE

## 2024-04-22 VITALS — HEIGHT: 63 IN | WEIGHT: 146 LBS | BODY MASS INDEX: 25.87 KG/M2

## 2024-04-22 DIAGNOSIS — M81.0 OSTEOPOROSIS, POSTMENOPAUSAL: Primary | ICD-10-CM

## 2024-04-22 DIAGNOSIS — Z51.81 MEDICATION MONITORING ENCOUNTER: ICD-10-CM

## 2024-04-22 DIAGNOSIS — M81.0 OSTEOPOROSIS, POSTMENOPAUSAL: ICD-10-CM

## 2024-04-22 PROCEDURE — 1036F TOBACCO NON-USER: CPT | Performed by: INTERNAL MEDICINE

## 2024-04-22 PROCEDURE — 99214 OFFICE O/P EST MOD 30 MIN: CPT | Performed by: INTERNAL MEDICINE

## 2024-04-22 PROCEDURE — 1090F PRES/ABSN URINE INCON ASSESS: CPT | Performed by: INTERNAL MEDICINE

## 2024-04-22 PROCEDURE — G2211 COMPLEX E/M VISIT ADD ON: HCPCS | Performed by: INTERNAL MEDICINE

## 2024-04-22 PROCEDURE — G8419 CALC BMI OUT NRM PARAM NOF/U: HCPCS | Performed by: INTERNAL MEDICINE

## 2024-04-22 PROCEDURE — 77080 DXA BONE DENSITY AXIAL: CPT

## 2024-04-22 PROCEDURE — G8399 PT W/DXA RESULTS DOCUMENT: HCPCS | Performed by: INTERNAL MEDICINE

## 2024-04-22 PROCEDURE — 1123F ACP DISCUSS/DSCN MKR DOCD: CPT | Performed by: INTERNAL MEDICINE

## 2024-04-22 PROCEDURE — G8427 DOCREV CUR MEDS BY ELIG CLIN: HCPCS | Performed by: INTERNAL MEDICINE

## 2024-04-22 PROCEDURE — 77080 DXA BONE DENSITY AXIAL: CPT | Performed by: INTERNAL MEDICINE

## 2024-04-22 NOTE — PROGRESS NOTES
[facility] Osteoporosis and Bone Health Services  [address] [place] [address] [address] [place], [place] [address]  Phone [phone]  Fax [phone]    NAME:  YONI VALENTE  :  1945  CONSULT DATE:    2015  MOST RECENT VISIT:  2023  TODAY'S DATE:  2024    Labs @ [facility] 2023    PROBLEMS.  Osteoporosis by DXA 2006, lowest T-score -2.8 in the spine (L2-L4)    Family history of osteoporosis, mother    Humerus fracture age 54, slipped on wet grass  Surgical menopause age 28, estrogen 5480-8602  Hypertension  DM2    CURRENT MANAGEMENT FOR OSTEOPOROSIS.  Calcium, 300 mg from low calcium foods,  150 mg milk, 300 mg yogurt, 300 mg cheese, 100 mg cottage cheese   diet MVI Ca+D other total    Calcium 1150    1150 mg/d   Vitamin D     2000 IU/d     25-OH D 46 ng/mL 2022 (desirable is 30-60 ng/mL)  Exercise, 30 min 2 x weekly, cardio and free weights  Pharmacologic therapy: Prolia 60 mg SQ twice yearly started 2017    PREVIOUS MEDICATIONS FOR OSTEOPOROSIS.   Alendronate 4565-3778, stopped because T-scores were not stable  Actonel [id number], changed to Boniva per insurance  Boniva 150 mg monthly 2014-2015, stopped for a “holiday”    OTHER CURRENT MEDICATIONS (SELECTED): verapamil, metformin  OTC MEDICATIONS (SELECTED): Zantac, aspirin, fish oil, biotin, vitamins C and E    CHIEF COMPLAINT.  Here for f/u of osteoporosis, monitoring treatment. No new related signs or symptoms.    INTERVAL HISTORY.  See problem list for chronic/inactive conditions.  She received Prolia without side effects. No falls, near-falls or fractures. She feels well overall.    FOR FULL DETAILS OF FAMILY HISTORY, PAST MEDICAL AND SURGICAL HISTORY, SOCIAL HISTORY, AND REVIEW OF SYSTEMS, SEE PATIENT QUESTIONNAIRE OF TODAY'S DATE.    PHYSICAL EXAMINATION.   GENERAL.  Well-nourished, well-developed, normally proportioned adult.   MENTAL STATUS. Pleasant mood.  Oriented to time, place, and person.  ORAL. Teeth

## 2024-08-16 ENCOUNTER — TELEPHONE (OUTPATIENT)
Dept: ENDOCRINOLOGY | Age: 79
End: 2024-08-16

## 2024-08-29 ENCOUNTER — NURSE ONLY (OUTPATIENT)
Dept: ENDOCRINOLOGY | Age: 79
End: 2024-08-29
Payer: MEDICARE

## 2024-08-29 DIAGNOSIS — M81.0 OSTEOPOROSIS, POSTMENOPAUSAL: Primary | ICD-10-CM

## 2024-08-29 PROCEDURE — 96372 THER/PROPH/DIAG INJ SC/IM: CPT | Performed by: INTERNAL MEDICINE

## 2025-02-26 ENCOUNTER — TELEPHONE (OUTPATIENT)
Dept: ENDOCRINOLOGY | Age: 80
End: 2025-02-26

## 2025-03-06 ENCOUNTER — NURSE ONLY (OUTPATIENT)
Dept: ENDOCRINOLOGY | Age: 80
End: 2025-03-06
Payer: MEDICARE

## 2025-03-06 DIAGNOSIS — M81.0 OSTEOPOROSIS, POSTMENOPAUSAL: Primary | ICD-10-CM

## 2025-03-06 PROCEDURE — 96372 THER/PROPH/DIAG INJ SC/IM: CPT | Performed by: INTERNAL MEDICINE

## 2025-03-21 DIAGNOSIS — M81.0 OSTEOPOROSIS, POSTMENOPAUSAL: Primary | ICD-10-CM

## 2025-04-23 ENCOUNTER — OFFICE VISIT (OUTPATIENT)
Dept: ENDOCRINOLOGY | Age: 80
End: 2025-04-23
Payer: MEDICARE

## 2025-04-23 ENCOUNTER — HOSPITAL ENCOUNTER (OUTPATIENT)
Dept: GENERAL RADIOLOGY | Age: 80
Discharge: HOME | End: 2025-04-23
Payer: MEDICARE

## 2025-04-23 VITALS — BODY MASS INDEX: 25.48 KG/M2 | WEIGHT: 143.8 LBS | HEIGHT: 63 IN

## 2025-04-23 DIAGNOSIS — M81.0 OSTEOPOROSIS, POSTMENOPAUSAL: Primary | ICD-10-CM

## 2025-04-23 DIAGNOSIS — Z51.81 MEDICATION MONITORING ENCOUNTER: ICD-10-CM

## 2025-04-23 DIAGNOSIS — M81.0 OSTEOPOROSIS, POSTMENOPAUSAL: ICD-10-CM

## 2025-04-23 PROCEDURE — 4004F PT TOBACCO SCREEN RCVD TLK: CPT | Performed by: INTERNAL MEDICINE

## 2025-04-23 PROCEDURE — 1123F ACP DISCUSS/DSCN MKR DOCD: CPT | Performed by: INTERNAL MEDICINE

## 2025-04-23 PROCEDURE — 99214 OFFICE O/P EST MOD 30 MIN: CPT | Performed by: INTERNAL MEDICINE

## 2025-04-23 PROCEDURE — 1159F MED LIST DOCD IN RCRD: CPT | Performed by: INTERNAL MEDICINE

## 2025-04-23 PROCEDURE — 77080 DXA BONE DENSITY AXIAL: CPT | Performed by: INTERNAL MEDICINE

## 2025-04-23 PROCEDURE — G8399 PT W/DXA RESULTS DOCUMENT: HCPCS | Performed by: INTERNAL MEDICINE

## 2025-04-23 PROCEDURE — G2211 COMPLEX E/M VISIT ADD ON: HCPCS | Performed by: INTERNAL MEDICINE

## 2025-04-23 PROCEDURE — G8419 CALC BMI OUT NRM PARAM NOF/U: HCPCS | Performed by: INTERNAL MEDICINE

## 2025-04-23 PROCEDURE — 1090F PRES/ABSN URINE INCON ASSESS: CPT | Performed by: INTERNAL MEDICINE

## 2025-04-23 PROCEDURE — G8427 DOCREV CUR MEDS BY ELIG CLIN: HCPCS | Performed by: INTERNAL MEDICINE

## 2025-04-23 PROCEDURE — 77080 DXA BONE DENSITY AXIAL: CPT

## 2025-04-23 NOTE — PROGRESS NOTES
[facility]  [address] [place] [address] [address] [place] [id number]  Phone [phone]  Fax [phone]    NAME:  YONI VALENTE  :  1945  CONSULT DATE:    2015  MOST RECENT VISIT:  2024  TODAY'S DATE:  2025    Labs @ [facility] 2024    PROBLEMS.  Osteoporosis by DXA 2006, lowest T-score -2.8 in the spine (L2-L4)    Family history of osteoporosis, mother    Humerus fracture age 54, slipped on wet grass  Surgical menopause age 28, estrogen [id number]  Hypertension  DM2    CURRENT MANAGEMENT FOR OSTEOPOROSIS.  Calcium, 300 mg from low calcium foods,  150 mg milk, 300 mg yogurt, 300 mg cheese, 100 mg cottage cheese   diet MVI Ca+D other total    Calcium 1150    1150 mg/d   Vitamin D     2000 IU/d     25-OH D 53 ng/mL 2022 (desirable is 30-60 ng/mL)  Exercise, 30 min 2 x weekly, cardio and free weights  Pharmacologic therapy: Prolia 60 mg SQ twice yearly started 2017    PREVIOUS MEDICATIONS FOR OSTEOPOROSIS.   Alendronate [id number], stopped because T-scores were not stable  Actonel [id number], changed to Boniva per insurance  Boniva 150 mg monthly 2014-2015, stopped for a “holiday”    OTHER CURRENT MEDICATIONS (SELECTED): verapamil, metformin  OTC MEDICATIONS (SELECTED): Zantac, aspirin, fish oil, biotin, vitamins C and E    CHIEF COMPLAINT.  Here for f/u of osteoporosis, monitoring treatment. No new related signs or symptoms.    INTERVAL HISTORY.  See problem list for chronic/inactive conditions.  She received Prolia without side effects. No falls, near-falls or fractures. Almost fully recovered from a severe case of flu 2025.    FOR FULL DETAILS OF FAMILY HISTORY, PAST MEDICAL AND SURGICAL HISTORY, SOCIAL HISTORY, SEE PATIENT QUESTIONNAIRE OF TODAY'S DATE.    PHYSICAL EXAMINATION.   GENERAL.  Well-nourished, well-developed, normally proportioned adult.   MENTAL STATUS. Pleasant mood.  Oriented to time, place, and

## 2025-09-03 ENCOUNTER — TELEPHONE (OUTPATIENT)
Dept: ENDOCRINOLOGY | Age: 80
End: 2025-09-03